# Patient Record
Sex: FEMALE | URBAN - METROPOLITAN AREA
[De-identification: names, ages, dates, MRNs, and addresses within clinical notes are randomized per-mention and may not be internally consistent; named-entity substitution may affect disease eponyms.]

---

## 2024-03-18 ENCOUNTER — LAB (OUTPATIENT)
Dept: URBAN - METROPOLITAN AREA CLINIC 111 | Facility: CLINIC | Age: 59
End: 2024-03-18

## 2024-03-18 ENCOUNTER — OV NP (OUTPATIENT)
Dept: URBAN - METROPOLITAN AREA CLINIC 111 | Facility: CLINIC | Age: 59
End: 2024-03-18
Payer: COMMERCIAL

## 2024-03-18 VITALS
TEMPERATURE: 97.9 F | SYSTOLIC BLOOD PRESSURE: 137 MMHG | WEIGHT: 181.4 LBS | BODY MASS INDEX: 28.47 KG/M2 | HEIGHT: 67 IN | HEART RATE: 76 BPM | DIASTOLIC BLOOD PRESSURE: 73 MMHG

## 2024-03-18 DIAGNOSIS — K52.89 (LYMPHOCYTIC) MICROSCOPIC COLITIS: ICD-10-CM

## 2024-03-18 DIAGNOSIS — K63.89 COLONIC MASS: ICD-10-CM

## 2024-03-18 DIAGNOSIS — K57.90 DIVERTICULAR DISEASE: ICD-10-CM

## 2024-03-18 DIAGNOSIS — R93.89 ABNORMAL CT SCAN: ICD-10-CM

## 2024-03-18 PROBLEM — 129679001: Status: ACTIVE | Noted: 2024-03-18

## 2024-03-18 PROBLEM — 397881000: Status: ACTIVE | Noted: 2024-03-18

## 2024-03-18 PROCEDURE — 99244 OFF/OP CNSLTJ NEW/EST MOD 40: CPT | Performed by: PHYSICIAN ASSISTANT

## 2024-03-18 PROCEDURE — 99204 OFFICE O/P NEW MOD 45 MIN: CPT | Performed by: PHYSICIAN ASSISTANT

## 2024-03-18 RX ORDER — POLYETHYLENE GLYCOL 3350, SODIUM SULFATE, SODIUM CHLORIDE, POTASSIUM CHLORIDE, ASCORBIC ACID, SODIUM ASCORBATE 140-9-5.2G
ML KIT ORAL AS DIRECTED
Qty: 1 KIT | Refills: 0 | OUTPATIENT
Start: 2024-03-18 | End: 2024-03-19

## 2024-03-18 NOTE — HPI-TODAY'S VISIT:
58 y/o female with suspected bone cancer here with abnormal CT. This patient was referred by Dr. Vince Garrison. A copy of this will be sent to the referring provider. Recent CT with colon mass. Bone biopsy results pending. Followed by MD Cardoso.   Pt reports a new diagnosis of suspected bone cancer. CT a/p  revealing extensive diverticulosis and large fatty mass in hepatic flexure of colon, probably a lipoma. Small fatty lesion in transverse duodenum probably another lipoma. No metastatic disease noted. Fatty liver and enlarged liver.  Last colonoscopy 11 years ago that was negative.  She c/o explosive diarrhea and having accidents. She had her GB removed many years ago and on Metformin. Reports she was changed to a different Metformin but symptoms continued. She has never tried a bile acid sequestrant.  No blood in the stool. She has at least one stool daily. No weight loss.  No ETOH. Her dad  from ETOH cirrhosis. No FH of GI cancer. No heart or lung problems. No chest pain or SOB.

## 2024-03-25 ENCOUNTER — COLON (OUTPATIENT)
Dept: URBAN - METROPOLITAN AREA LAB 3 | Facility: LAB | Age: 59
End: 2024-03-25

## 2024-04-03 ENCOUNTER — OV EP (OUTPATIENT)
Dept: URBAN - METROPOLITAN AREA CLINIC 111 | Facility: CLINIC | Age: 59
End: 2024-04-03
Payer: COMMERCIAL

## 2024-04-03 VITALS
HEART RATE: 90 BPM | DIASTOLIC BLOOD PRESSURE: 71 MMHG | WEIGHT: 181.2 LBS | TEMPERATURE: 97.9 F | HEIGHT: 67 IN | BODY MASS INDEX: 28.44 KG/M2 | SYSTOLIC BLOOD PRESSURE: 133 MMHG

## 2024-04-03 DIAGNOSIS — K90.89 BILE SALT-INDUCED DIARRHEA: ICD-10-CM

## 2024-04-03 DIAGNOSIS — D17.5 LIPOMA OF COLON: ICD-10-CM

## 2024-04-03 PROBLEM — 69980003: Status: ACTIVE | Noted: 2024-04-03

## 2024-04-03 PROCEDURE — 99213 OFFICE O/P EST LOW 20 MIN: CPT | Performed by: PHYSICIAN ASSISTANT

## 2024-04-03 RX ORDER — COLESTIPOL HYDROCHLORIDE 5 G/1
1 PACKET MIXED WITH WATER OR NON-CARBONATED DRINK SUSPENSION ORAL ONCE A DAY
Qty: 30 | Refills: 5 | OUTPATIENT
Start: 2024-04-03

## 2024-04-03 NOTE — PHYSICAL EXAM CHEST:
breathing is unlabored without accessory muscle use Anesthesia Type: 2% lidocaine with epinephrine and a 1:10 solution of 8.4% sodium bicarbonate

## 2024-04-03 NOTE — HPI-TODAY'S VISIT:
58 y/o female here to follow up. Seen by me 3/18 for CT with large fatty mass in hepatic flexure and for diarrhea. Pt was undergoing work up for possible bone cancer. S/p colonoscopy 3/25 by Dr. Quezada revealing large lipoma at hepatic flexure and diverticulosis. Path negative. Repeat colon in 10 years. She c/o history of explosive diarrhea and having accidents. She had her GB removed many years ago and on Metformin. She has never tried a bile acid sequestrant.  She states biopsy she got for possible cancer was benign but MD Cardoso is still following her. She is going to get an injection in her hip and more imaging. She has been having diarrhea daily. No oil in the stool. Random colon biopsies negative.